# Patient Record
Sex: MALE | Employment: FULL TIME | URBAN - METROPOLITAN AREA
[De-identification: names, ages, dates, MRNs, and addresses within clinical notes are randomized per-mention and may not be internally consistent; named-entity substitution may affect disease eponyms.]

---

## 2019-07-17 ENCOUNTER — HOSPITAL ENCOUNTER (OUTPATIENT)
Age: 44
Discharge: HOME | End: 2019-07-17
Attending: PHYSICIAN ASSISTANT
Payer: COMMERCIAL

## 2019-07-17 DIAGNOSIS — R25.1 TREMOR: ICD-10-CM

## 2019-07-17 PROCEDURE — 70551 MRI BRAIN STEM W/O DYE: CPT

## 2019-09-09 ENCOUNTER — OFFICE VISIT (OUTPATIENT)
Dept: NEUROLOGY | Age: 44
End: 2019-09-09

## 2019-09-09 VITALS
WEIGHT: 201 LBS | SYSTOLIC BLOOD PRESSURE: 160 MMHG | RESPIRATION RATE: 20 BRPM | DIASTOLIC BLOOD PRESSURE: 120 MMHG | HEART RATE: 88 BPM | TEMPERATURE: 98.5 F | HEIGHT: 68 IN | OXYGEN SATURATION: 98 % | BODY MASS INDEX: 30.46 KG/M2

## 2019-09-09 DIAGNOSIS — R93.0 ABNORMAL MRI SCAN, HEAD: ICD-10-CM

## 2019-09-09 DIAGNOSIS — G25.0 TREMOR, ESSENTIAL: Primary | ICD-10-CM

## 2019-09-09 RX ORDER — LISINOPRIL AND HYDROCHLOROTHIAZIDE 12.5; 2 MG/1; MG/1
TABLET ORAL
COMMUNITY
Start: 2019-07-02

## 2019-09-09 RX ORDER — PROPRANOLOL HYDROCHLORIDE 60 MG/1
60 CAPSULE, EXTENDED RELEASE ORAL DAILY
Qty: 30 CAP | Refills: 5 | Status: SHIPPED | OUTPATIENT
Start: 2019-09-09

## 2019-09-09 NOTE — PROGRESS NOTES
Marivel June is a 40 y.o. male new patient in today to discuss tremors and abnormal MRI as referred by Matias Velazquez MD. Patient reports no pain or discomfort at this time.

## 2019-09-09 NOTE — Clinical Note
9/9/19 Patient: Shanthi Matos YOB: 1975 Date of Visit: 9/9/2019 Sherice Don MD 
VIA Dear Sherice Don MD, Thank you for referring Mr. Laureen Brittle to [facility] for evaluation. My notes for this consultation are attached. If you have questions, please do not hesitate to call me. I look forward to following your patient along with you.  
 
 
Sincerely, 
 
Michel Saravia MD

## 2019-09-09 NOTE — LETTER
9/9/2019 4:35 PM 
 
Patient:  Juana Bernheim YOB: 1975 Date of Visit: 9/9/2019 Dear 241 Rai Castellano MD 
[address] [place] [address] VIA Facsimile: [phone] 
 : 
 
 
Thank you for referring Mr. Mihaela Henderson to me for evaluation/treatment. Below are the relevant portions of my assessment and plan of care. If you have questions, please do not hesitate to call me. I look forward to following Mr. Bobbi Rivers along with you.  
 
 
 
Sincerely, 
 
 
Vaishali Owen MD

## 2019-09-09 NOTE — PROGRESS NOTES
Jay Staton is a 40 y.o., left handed male, with an established history of hypertension, who comes in with tremor. He's had the tremor since he's a child. It's intermittent. No eating, concentrating seems to make it worse. Nothing in particular makes it better. His father also has a tremor, which is worse than than the patient's he's not noted any particular change with alcohol. He's not noted and gait instability, bradykinesia in general.  He's had an MRI scan, which shows one abnormality and one congenital variant. Social History; , lives with his wife. Quit smoking 5-6 years ago. Drinks 5-6 drinks 3-4 times per week. No illicit drugs. He works at a [employer]. Family History; father alive with hypertension and tremor. Also diabetic. Mother alive and well. Siblings good health. Current Outpatient Medications   Medication Sig Dispense Refill    lisinopril-hydroCHLOROthiazide (PRINZIDE, ZESTORETIC) 20-12.5 mg per tablet          Past Medical History:   Diagnosis Date    Chest pain     Elevated liver enzymes     Hepatic steatosis     Hepatomegaly     HTN (hypertension)        No past surgical history on file. No Known Allergies    There is no problem list on file for this patient.         Review of Systems:   As above otherwise 11 point review of systems negative including;   Constitutional no fever or chills  Skin denies rash or itching  HENT  Denies tinnitus, hearing lose  Eyes denies diplopia vision lose  Respiratory denies shortness of breath  Cardiovascular denies chest pain, dyspnea on exertion  Gastrointestinal denies nausea, vomiting, diarrhea, constipation  Genitourinary denies incontinence  Musculoskeletal denies joint pain or swelling  Endocrine denies weight change  Hematology denies easy bruising or bleeding   Neurological as above in HPI      PHYSICAL EXAMINATION:      VITAL SIGNS:    Visit Vitals  BP (!) 160/120 (BP 1 Location: Left arm, BP Patient Position: Sitting)   Pulse 88   Temp 98.5 °F (36.9 °C) (Oral)   Resp 20   Ht 5' 8\" (1.727 m)   Wt 91.2 kg (201 lb)   SpO2 98%   BMI 30.56 kg/m²       GENERAL: The patient is well developed, well nourished, and in no apparent distress. EXTREMITIES: No clubbing, cyanosis, or edema is identified. Pulses 2+ and symmetrical.  Muscle tone is normal.  HEAD:   Ear, nose, and throat appear to be without trauma. The patient is normocephalic. NEUROLOGIC EXAMINATION    MENTAL STATUS: The patient is awake, alert, and oriented x 4. Fund of knowledge is adequate. Speech is fluent and memory appears to be intact, both long and short term. CRANIAL NERVES: II - Visual fields are full to confrontation. Funduscopic examination reveals flat disks bilaterally. Pupils are both 4 mm and briskly reactive to light and accommodation. III, IV, VI - Extraocular movements are intact and there is no nystagmus. V - Facial sensation is intact to pinprick and light touch. VII - Face is symmetrical.   VIII - Hearing is present. IX, X, XII- Palate rises symmetrically. Gag is present. Tongue is in the midline. XI - Shoulder shrugging and head turning intact  MOTOR:  The patient is 5/5 in all four limbs without any drift. Fine finger movements are symmetrical.  Isolated motor group testing reveals no focal abnormalities. Tone is normal.  Sensory examination is intact to pinprick, light touch and position sense testing. Reflexes are 2+ and symmetrical. Plantars are down going. Cerebellar examination reveals no gross ataxia or dysmetria. Gait is normal and the patient can tandem walk without any difficulty. He has a coarse tremor of the hands. No cogwheel rigidity. Final result (Exam End: 7/17/2019 07:24) Provider Status: Open   Study Result     Brain MR without contrast     History: Tremors     Comparison: None     Technique: Multisequence multiplanar MR imaging acquired through the brain.   Includes diffusion imaging and heme sensitive imaging.     Contrast used: None     Findings:      Parenchyma: No acute infarction. No acute hemorrhage. No mass lesion. Punctate  white matter high T2/FLAIR hyperintensity in the right corona radiata and  subcortical posterior right frontal lobe, nonspecific.     CSF spaces: Ventricles and cisterns remain midline in position     IAC regions: Unremarkable     Parasellar region: Unremarkable     Vasculature: Appropriate flow voids within the major skull base vasculature.     Cervicomedullary junction: Crowding of the foramen magnum 3 mm cerebellar  tonsillar descent below the foramen magnum.     Orbits: Unremarkable     Paranasal sinuses clear mastoid air cells: Clear      Calvarium and upper cervical spine: Unremarkable     IMPRESSION  IMPRESSION:     1. No evidence of acute infarct, hemorrhage or mass. 2. Very nonspecific couple punctate right frontal white matter foci and  differential include but not limited to white matter changes due to  microvascular disease or tiny demyelinating lesions. 3. 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            I have reviewed the above imagines myself.        CBC:   Lab Results   Component Value Date/Time    WBC 7.1 01/19/2016 05:50 PM    RBC 4.62 (L) 01/19/2016 05:50 PM    HGB 15.1 01/19/2016 05:50 PM    HCT 42.8 01/19/2016 05:50 PM    PLATELET 573 63/73/2254 05:50 PM     BMP:   Lab Results   Component Value Date/Time    Glucose 109 (H) 01/19/2016 05:50 PM    Sodium 140 01/19/2016 05:50 PM    Potassium 3.8 01/19/2016 05:50 PM    Chloride 103 01/19/2016 05:50 PM    CO2 28 01/19/2016 05:50 PM    BUN 10 01/19/2016 05:50 PM    Creatinine 1.00 01/19/2016 05:50 PM    Calcium 9.4 01/19/2016 05:50 PM     CMP:   Lab Results   Component Value Date/Time    Glucose 109 (H) 01/19/2016 05:50 PM    Sodium 140 01/19/2016 05:50 PM    Potassium 3.8 01/19/2016 05:50 PM    Chloride 103 01/19/2016 05:50 PM    CO2 28 01/19/2016 05:50 PM    BUN 10 01/19/2016 05:50 PM    Creatinine 1.00 01/19/2016 05:50 PM    Calcium 9.4 01/19/2016 05:50 PM    Anion gap 9 01/19/2016 05:50 PM    BUN/Creatinine ratio 10 (L) 01/19/2016 05:50 PM    Alk. phosphatase 79 01/19/2016 05:50 PM    Protein, total 8.0 01/19/2016 05:50 PM    Albumin 4.2 01/19/2016 05:50 PM    Globulin 3.8 01/19/2016 05:50 PM    A-G Ratio 1.1 01/19/2016 05:50 PM     Coagulation: No results found for: PTP, INR, APTT, PTTT  Cardiac markers:   Lab Results   Component Value Date/Time    CK 92 01/19/2016 05:50 PM    CK-MB Index 1.0 01/19/2016 05:50 PM          Impression: Intention tremor in this man who has risk factors including significant family history of tremor. He seems to have a fairly benign tremor with absolutely no signs or symptoms of Parkinson's disease. He also has a significant family history with his dad having tremor. MRI scan showing 2 areas of white matter change that are completely benign and consistent with his age. He also has some crowding of the cerebellar structures at the foramen magnum. This also is a benign incidental finding. Plan: Lengthy discussion with the patient. He understands that treating this tremor will not change his overall prognosis. At this time he chooses not to be treated saying that it is not really impacting on his lifestyle. Nothing to be done for the 2 incidental MRI scan findings. I will see him back routinely in 6 months. PLEASE NOTE:   This document has been produced using voice recognition software. Unrecognized errors in transcription may be present.